# Patient Record
Sex: FEMALE | Race: WHITE
[De-identification: names, ages, dates, MRNs, and addresses within clinical notes are randomized per-mention and may not be internally consistent; named-entity substitution may affect disease eponyms.]

---

## 2021-10-04 ENCOUNTER — HOSPITAL ENCOUNTER (EMERGENCY)
Dept: HOSPITAL 56 - MW.ED | Age: 86
Discharge: HOME | End: 2021-10-04
Payer: MEDICARE

## 2021-10-04 DIAGNOSIS — S42.402A: Primary | ICD-10-CM
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
## 2021-10-04 PROCEDURE — 29105 APPLICATION LONG ARM SPLINT: CPT

## 2021-10-04 PROCEDURE — 99283 EMERGENCY DEPT VISIT LOW MDM: CPT

## 2021-10-04 PROCEDURE — 73080 X-RAY EXAM OF ELBOW: CPT

## 2021-10-04 NOTE — CR
Indication:



Fall, pain



Technique:



Three views



Comparison:



None



Findings:



There is a transverse fracture of the distal humerus consistent with a 

supracondylar fracture. There is anterior displacement of the distal 

fracture fragment of 6 millimeters and anterior angulation of the distal 

fracture fragment. Associated elbow effusion with adjacent soft tissue 

swelling.



Dictated by Morgan Frias MD @ 10/4/2021 4:28:10 AM



(Electronically Signed)

## 2021-10-04 NOTE — EDM.PDOC
ED HPI GENERAL MEDICAL PROBLEM





- General


Chief Complaint: Upper Extremity Injury/Pain


Stated Complaint: LEFT ELBOW INJURY


Time Seen by Provider: 10/04/21 03:36


Source of Information: Reports: Patient


History Limitations: Reports: No Limitations





- History of Present Illness


INITIAL COMMENTS - FREE TEXT/NARRATIVE: 





Patient is a 86-year-old female who presents for left elbow pain.  Patient was 

walking down steps and when she had her last that she missed that and fell to 

her left side striking her elbow.  Patient not hit her head or have any LOC.  

Patient currently denies elbow pain is made worse with trying to range it.  She 

is not take any medication for this pain.  She is at her baseline.


Treatments PTA: Reports: Other (see below)


Other Treatments PTA: sling to left arm





- Related Data


                                    Allergies











Allergy/AdvReac Type Severity Reaction Status Date / Time


 


No Known Allergies Allergy   Verified 10/04/21 03:27











Home Meds: 


                                    Home Meds





. [Unable to Verify Home Med List]  10/04/21 [History]











Review of Systems





- Review of Systems


Review Of Systems: See Below


Constitutional: Reports: No Symptoms


Eyes: Reports: No Symptoms


Ears: Reports: No Symptoms


Nose: Reports: No Symptoms


Mouth/Throat: Reports: No Symptoms


Respiratory: Reports: No Symptoms


Cardiovascular: Reports: No Symptoms


GI/Abdominal: Reports: No Symptoms


Genitourinary: Reports: No Symptoms


Musculoskeletal: Reports: Arm Pain


Skin: Reports: No Symptoms


Neurological: Reports: No Symptoms


Psychiatric: Reports: No Symptoms





ED EXAM, GENERAL





- Physical Exam


Exam: See Below


Exam Limited By: No Limitations


General Appearance: Alert, WD/WN, No Apparent Distress


Eye Exam: Bilateral Eye: EOMI, PERRL


Ears: Normal External Exam


Nose: Normal Inspection


Throat/Mouth: Normal Inspection


Head: Atraumatic, Normocephalic


Neck: Normal Inspection, Non-Tender


Respiratory/Chest: No Respiratory Distress, Lungs Clear, Normal Breath Sounds


Cardiovascular: Normal Peripheral Pulses, Regular Rate, Rhythm


GI/Abdominal: Normal Bowel Sounds, Soft, Non-Tender


Extremities: Normal Inspection, Non-Tender.  No: Normal Range of Motion (Likely 

due to pain)


Neurological: Alert, Oriented





ED TRAUMA EXTREMITY PROCEDURES





- Splinting


  ** Left Upper Extremity


Splint Site: elbow


Pre-Procedure NV Status: Normal


Post-Procedure NV Status: Normal


Splint Material: Fiberglass


Splint Design: Posterior


Applied & Form Fitted By: Provider, Nurse


Provider Post-Splint Application NV Check: NV Status Normal


Complications: No





Course





- Vital Signs


Last Recorded V/S: 


                                Last Vital Signs











Temp  98.3 F   10/04/21 03:28


 


Pulse  62   10/04/21 03:28


 


Resp  18   10/04/21 03:28


 


BP  134/60   10/04/21 03:28


 


Pulse Ox  62 L  10/04/21 03:28














- Orders/Labs/Meds


Orders: 


                               Active Orders 24 hr











 Category Date Time Status


 


 DME for Discharge [COMM] Stat Oth  10/04/21 04:49 Ordered














- Re-Assessments/Exams


Free Text/Narrative Re-Assessment/Exam: 





10/04/21 04:37


Patient x-ray shows a humerus fracture.  Patient will be placed in a sling and 

will follow up with Ortho as outpatient.


10/04/21 04:47


Patient placed in a sling and still neurovascularly intact.





Departure





- Departure


Time of Disposition: 04:38


Disposition: Home, Self-Care 01


Condition: Good


Clinical Impression: 


 Humeral distal fracture








- Discharge Information


*PRESCRIPTION DRUG MONITORING PROGRAM REVIEWED*: Not Applicable


*COPY OF PRESCRIPTION DRUG MONITORING REPORT IN PATIENT ARLYN: Not Applicable


Instructions:  Humerus Fracture Treated With Immobilization, Easy-to-Read


Referrals: 


PCP,None [Primary Care Provider] - 


Forms:  ED Department Discharge


Additional Instructions: 


The following information is given to patients seen in the emergency department 

who are being discharged to home. This information is to outline your options 

for follow-up care. We provide all patients seen in our emergency department 

with a follow-up referral.





The need for follow-up, as well as the timing and circumstances, are variable 

depending upon the specifics of your emergency department visit.





If you don't have a primary care physician on staff, we will provide you with a 

referral. We always advise you to contact your personal physician following an 

emergency department visit to inform them of the circumstance of the visit and 

for follow-up with them and/or the need for any referrals to a consulting 

specialist.





The emergency department will also refer you to a specialist when appropriate. 

This referral assures that you have the opportunity for follow-up care with a 

specialist. All of these measure are taken in an effort to provide you with 

optimal care, which includes your follow-up.





Under all circumstances we always encourage you to contact your private 

physician who remains a resource for coordinating your care. When calling for 

follow-up care, please make the office aware that this follow-up is from your 

recent emergency room visit. If for any reason you are refused follow-up, please

contact the [facility] Emergency Department

at [phone] and asked to speak to the emergency department charge nurse.





Please follow up with your primary care physician. If you do not have a primary 

care physician, see below:


[facility]


[address]


[address] [place] [address], [address]


[place] [address]


Phone: [phone]


Fax: [phone]








Orthopedic Surgery [place]


[phone]


[id number] [place] [address]


[address], 1st [address]





You were seen today after a fall.  Your x-ray shows a fracture of your distal 

humerus.  We placed you in a sling for this.  Above the number of the orthopedic

surgeon here in [place] also in [place].  Please call and obtain an appointment 

for your follow-up.  If you have any increased pain or other concerning symptoms

please return to the ED otherwise follow-up with orthopedic surgeons above.  

Call this week to make an appointment.








Sepsis Event Note (ED)





- Focused Exam


Vital Signs: 


                                   Vital Signs











  Temp Pulse Resp BP Pulse Ox


 


 10/04/21 03:28  98.3 F  62  18  134/60  62 L














- My Orders


Last 24 Hours: 


My Active Orders





10/04/21 04:49


DME for Discharge [COMM] Stat 














- Assessment/Plan


Last 24 Hours: 


My Active Orders





10/04/21 04:49


DME for Discharge [COMM] Stat 











Plan: 





Patient is 86-year-old female presents today for left elbow pain after missing a

 step and falling onto it.  No deformity noted will obtain x-rays and reassess.

## 2022-07-29 ENCOUNTER — HOSPITAL ENCOUNTER (INPATIENT)
Dept: HOSPITAL 56 - MW.ED | Age: 87
LOS: 8 days | Discharge: NURSING, REHAB OR LONG-TERM CARE | DRG: 177 | End: 2022-08-06
Attending: INTERNAL MEDICINE | Admitting: INTERNAL MEDICINE
Payer: MEDICARE

## 2022-07-29 DIAGNOSIS — Z20.822: ICD-10-CM

## 2022-07-29 DIAGNOSIS — J12.82: ICD-10-CM

## 2022-07-29 DIAGNOSIS — Z79.899: ICD-10-CM

## 2022-07-29 DIAGNOSIS — J69.0: ICD-10-CM

## 2022-07-29 DIAGNOSIS — F02.80: ICD-10-CM

## 2022-07-29 DIAGNOSIS — B02.9: ICD-10-CM

## 2022-07-29 DIAGNOSIS — R56.9: ICD-10-CM

## 2022-07-29 DIAGNOSIS — R32: ICD-10-CM

## 2022-07-29 DIAGNOSIS — I10: ICD-10-CM

## 2022-07-29 DIAGNOSIS — Z66: ICD-10-CM

## 2022-07-29 DIAGNOSIS — R09.02: ICD-10-CM

## 2022-07-29 DIAGNOSIS — J96.01: ICD-10-CM

## 2022-07-29 DIAGNOSIS — G30.9: ICD-10-CM

## 2022-07-29 DIAGNOSIS — E87.6: ICD-10-CM

## 2022-07-29 DIAGNOSIS — U07.1: Primary | ICD-10-CM

## 2022-07-29 LAB
BUN SERPL-MCNC: 19 MG/DL (ref 7–18)
CHLORIDE SERPL-SCNC: 95 MMOL/L (ref 98–107)
CO2 SERPL-SCNC: 30.5 MMOL/L (ref 21–32)
EGFRCR SERPLBLD CKD-EPI 2021: 72 ML/MIN (ref 60–?)
GLUCOSE SERPL-MCNC: 134 MG/DL (ref 74–106)
POTASSIUM SERPL-SCNC: 3.7 MMOL/L (ref 3.5–5.1)
SODIUM SERPL-SCNC: 133 MMOL/L (ref 136–145)

## 2022-07-29 PROCEDURE — 87070 CULTURE OTHR SPECIMN AEROBIC: CPT

## 2022-07-29 PROCEDURE — 96365 THER/PROPH/DIAG IV INF INIT: CPT

## 2022-07-29 PROCEDURE — 36415 COLL VENOUS BLD VENIPUNCTURE: CPT

## 2022-07-29 PROCEDURE — 82945 GLUCOSE OTHER FLUID: CPT

## 2022-07-29 PROCEDURE — 87040 BLOOD CULTURE FOR BACTERIA: CPT

## 2022-07-29 PROCEDURE — 84157 ASSAY OF PROTEIN OTHER: CPT

## 2022-07-29 PROCEDURE — 82803 BLOOD GASES ANY COMBINATION: CPT

## 2022-07-29 PROCEDURE — U0002 COVID-19 LAB TEST NON-CDC: HCPCS

## 2022-07-29 PROCEDURE — 87205 SMEAR GRAM STAIN: CPT

## 2022-07-29 PROCEDURE — 62270 DX LMBR SPI PNXR: CPT

## 2022-07-29 PROCEDURE — 96375 TX/PRO/DX INJ NEW DRUG ADDON: CPT

## 2022-07-29 PROCEDURE — XW033E5 INTRODUCTION OF REMDESIVIR ANTI-INFECTIVE INTO PERIPHERAL VEIN, PERCUTANEOUS APPROACH, NEW TECHNOLOGY GROUP 5: ICD-10-PCS | Performed by: INTERNAL MEDICINE

## 2022-07-29 PROCEDURE — 85025 COMPLETE CBC W/AUTO DIFF WBC: CPT

## 2022-07-29 PROCEDURE — 71045 X-RAY EXAM CHEST 1 VIEW: CPT

## 2022-07-29 PROCEDURE — 70450 CT HEAD/BRAIN W/O DYE: CPT

## 2022-07-29 PROCEDURE — 80053 COMPREHEN METABOLIC PANEL: CPT

## 2022-07-29 PROCEDURE — 84484 ASSAY OF TROPONIN QUANT: CPT

## 2022-07-29 PROCEDURE — 93005 ELECTROCARDIOGRAM TRACING: CPT

## 2022-07-29 PROCEDURE — 82248 BILIRUBIN DIRECT: CPT

## 2022-07-29 PROCEDURE — 89050 BODY FLUID CELL COUNT: CPT

## 2022-07-29 PROCEDURE — 83605 ASSAY OF LACTIC ACID: CPT

## 2022-07-29 PROCEDURE — 99285 EMERGENCY DEPT VISIT HI MDM: CPT

## 2022-07-30 LAB
BUN SERPL-MCNC: 15 MG/DL (ref 7–18)
CHLORIDE SERPL-SCNC: 98 MMOL/L (ref 98–107)
CO2 SERPL-SCNC: 29.2 MMOL/L (ref 21–32)
EGFRCR SERPLBLD CKD-EPI 2021: 84 ML/MIN (ref 60–?)
GLUCOSE SERPL-MCNC: 112 MG/DL (ref 74–106)
POTASSIUM SERPL-SCNC: 2.5 MMOL/L (ref 3.5–5.1)
SODIUM SERPL-SCNC: 135 MMOL/L (ref 136–145)

## 2022-07-31 LAB
BUN SERPL-MCNC: 16 MG/DL (ref 7–18)
CHLORIDE SERPL-SCNC: 100 MMOL/L (ref 98–107)
CO2 SERPL-SCNC: 29.1 MMOL/L (ref 21–32)
EGFRCR SERPLBLD CKD-EPI 2021: 87 ML/MIN (ref 60–?)
GLUCOSE SERPL-MCNC: 135 MG/DL (ref 74–106)
POTASSIUM SERPL-SCNC: 2.9 MMOL/L (ref 3.5–5.1)
SODIUM SERPL-SCNC: 136 MMOL/L (ref 136–145)

## 2022-07-31 RX ADMIN — LORAZEPAM PRN MG: 2 INJECTION INTRAMUSCULAR; INTRAVENOUS at 05:45

## 2022-07-31 RX ADMIN — LORAZEPAM PRN MG: 2 INJECTION INTRAMUSCULAR; INTRAVENOUS at 20:14

## 2022-08-01 LAB
BUN SERPL-MCNC: 13 MG/DL (ref 7–18)
CHLORIDE SERPL-SCNC: 101 MMOL/L (ref 98–107)
CO2 SERPL-SCNC: 25.9 MMOL/L (ref 21–32)
EGFRCR SERPLBLD CKD-EPI 2021: 96 ML/MIN (ref 60–?)
GLUCOSE SERPL-MCNC: 95 MG/DL (ref 74–106)
POTASSIUM SERPL-SCNC: 3.7 MMOL/L (ref 3.5–5.1)
SODIUM SERPL-SCNC: 137 MMOL/L (ref 136–145)

## 2022-08-01 RX ADMIN — LORAZEPAM PRN MG: 2 INJECTION INTRAMUSCULAR; INTRAVENOUS at 03:10

## 2022-08-02 LAB
BUN SERPL-MCNC: 15 MG/DL (ref 7–18)
CHLORIDE SERPL-SCNC: 98 MMOL/L (ref 98–107)
CO2 SERPL-SCNC: 24.6 MMOL/L (ref 21–32)
EGFRCR SERPLBLD CKD-EPI 2021: 91 ML/MIN (ref 60–?)
GLUCOSE SERPL-MCNC: 131 MG/DL (ref 74–106)
POTASSIUM SERPL-SCNC: 3.3 MMOL/L (ref 3.5–5.1)
SODIUM SERPL-SCNC: 135 MMOL/L (ref 136–145)

## 2022-08-02 PROCEDURE — 3E0DX3Z INTRODUCTION OF ANTI-INFLAMMATORY INTO MOUTH AND PHARYNX, EXTERNAL APPROACH: ICD-10-PCS | Performed by: INTERNAL MEDICINE

## 2022-08-02 RX ADMIN — DEXAMETHASONE SODIUM PHOSPHATE SCH MG: 4 INJECTION, SOLUTION INTRAMUSCULAR; INTRAVENOUS at 09:02

## 2022-08-02 RX ADMIN — DEXAMETHASONE SODIUM PHOSPHATE SCH MG: 4 INJECTION, SOLUTION INTRAMUSCULAR; INTRAVENOUS at 00:51

## 2022-08-03 LAB
BUN SERPL-MCNC: 13 MG/DL (ref 7–18)
CHLORIDE SERPL-SCNC: 97 MMOL/L (ref 98–107)
CO2 SERPL-SCNC: 26 MMOL/L (ref 21–32)
EGFRCR SERPLBLD CKD-EPI 2021: 91 ML/MIN (ref 60–?)
GLUCOSE SERPL-MCNC: 156 MG/DL (ref 74–106)
POTASSIUM SERPL-SCNC: 3.5 MMOL/L (ref 3.5–5.1)
SODIUM SERPL-SCNC: 132 MMOL/L (ref 136–145)

## 2022-08-03 RX ADMIN — LORAZEPAM PRN MG: 2 INJECTION INTRAMUSCULAR; INTRAVENOUS at 01:14

## 2022-08-03 RX ADMIN — DEXAMETHASONE SODIUM PHOSPHATE SCH MG: 4 INJECTION, SOLUTION INTRAMUSCULAR; INTRAVENOUS at 09:56

## 2022-08-04 LAB
BUN SERPL-MCNC: 15 MG/DL (ref 7–18)
CHLORIDE SERPL-SCNC: 99 MMOL/L (ref 98–107)
CO2 SERPL-SCNC: 25.9 MMOL/L (ref 21–32)
EGFRCR SERPLBLD CKD-EPI 2021: 87 ML/MIN (ref 60–?)
GLUCOSE SERPL-MCNC: 139 MG/DL (ref 74–106)
POTASSIUM SERPL-SCNC: 3.4 MMOL/L (ref 3.5–5.1)
SODIUM SERPL-SCNC: 135 MMOL/L (ref 136–145)

## 2022-08-04 RX ADMIN — DEXAMETHASONE SODIUM PHOSPHATE SCH MG: 4 INJECTION, SOLUTION INTRAMUSCULAR; INTRAVENOUS at 10:51

## 2022-08-05 LAB
BUN SERPL-MCNC: 18 MG/DL (ref 7–18)
CHLORIDE SERPL-SCNC: 102 MMOL/L (ref 98–107)
CO2 SERPL-SCNC: 26.7 MMOL/L (ref 21–32)
EGFRCR SERPLBLD CKD-EPI 2021: 87 ML/MIN (ref 60–?)
GLUCOSE SERPL-MCNC: 108 MG/DL (ref 74–106)
POTASSIUM SERPL-SCNC: 3.3 MMOL/L (ref 3.5–5.1)
SODIUM SERPL-SCNC: 137 MMOL/L (ref 136–145)

## 2022-08-05 RX ADMIN — DEXAMETHASONE SODIUM PHOSPHATE SCH MG: 4 INJECTION, SOLUTION INTRAMUSCULAR; INTRAVENOUS at 08:00

## 2022-08-06 LAB
BUN SERPL-MCNC: 13 MG/DL (ref 7–18)
CHLORIDE SERPL-SCNC: 100 MMOL/L (ref 98–107)
CO2 SERPL-SCNC: 20.5 MMOL/L (ref 21–32)
EGFRCR SERPLBLD CKD-EPI 2021: 91 ML/MIN (ref 60–?)
GLUCOSE SERPL-MCNC: 94 MG/DL (ref 74–106)
POTASSIUM SERPL-SCNC: 4.2 MMOL/L (ref 3.5–5.1)
SODIUM SERPL-SCNC: 132 MMOL/L (ref 136–145)

## 2022-08-06 RX ADMIN — DEXAMETHASONE SODIUM PHOSPHATE SCH MG: 4 INJECTION, SOLUTION INTRAMUSCULAR; INTRAVENOUS at 08:01

## 2022-10-26 ENCOUNTER — HOSPITAL ENCOUNTER (EMERGENCY)
Dept: HOSPITAL 56 - MW.ED | Age: 87
Discharge: HOME | End: 2022-10-26
Payer: MEDICARE

## 2022-10-26 DIAGNOSIS — Z79.899: ICD-10-CM

## 2022-10-26 DIAGNOSIS — K59.00: ICD-10-CM

## 2022-10-26 DIAGNOSIS — I10: ICD-10-CM

## 2022-10-26 DIAGNOSIS — M48.54XA: ICD-10-CM

## 2022-10-26 DIAGNOSIS — S01.01XA: Primary | ICD-10-CM
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
## 2022-10-26 DIAGNOSIS — Z23: ICD-10-CM

## 2022-10-26 PROCEDURE — 72131 CT LUMBAR SPINE W/O DYE: CPT

## 2022-10-26 PROCEDURE — 72125 CT NECK SPINE W/O DYE: CPT

## 2022-10-26 PROCEDURE — 71250 CT THORAX DX C-: CPT

## 2022-10-26 PROCEDURE — 12001 RPR S/N/AX/GEN/TRNK 2.5CM/<: CPT

## 2022-10-26 PROCEDURE — 93005 ELECTROCARDIOGRAM TRACING: CPT

## 2022-10-26 PROCEDURE — 72128 CT CHEST SPINE W/O DYE: CPT

## 2022-10-26 PROCEDURE — 90471 IMMUNIZATION ADMIN: CPT

## 2022-10-26 PROCEDURE — 70450 CT HEAD/BRAIN W/O DYE: CPT

## 2022-10-26 PROCEDURE — 99284 EMERGENCY DEPT VISIT MOD MDM: CPT

## 2022-10-26 PROCEDURE — 90715 TDAP VACCINE 7 YRS/> IM: CPT

## 2022-10-26 PROCEDURE — 74176 CT ABD & PELVIS W/O CONTRAST: CPT

## 2023-01-09 ENCOUNTER — HOSPITAL ENCOUNTER (EMERGENCY)
Dept: HOSPITAL 56 - MW.ED | Age: 88
Discharge: HOME | End: 2023-01-09
Payer: MEDICARE

## 2023-01-09 DIAGNOSIS — S72.002A: Primary | ICD-10-CM

## 2023-01-09 DIAGNOSIS — I10: ICD-10-CM
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
